# Patient Record
(demographics unavailable — no encounter records)

---

## 2025-07-24 NOTE — HISTORY OF PRESENT ILLNESS
[Improved Health] : Improved health [Quality of Life] : Quality of life [Home] : at home, [unfilled] , at the time of the visit. [Medical Office: ([facility])___] : at the medical office located in  [Telehealth (audio & video)] : This visit was provided via telehealth using real-time 2-way audio visual technology. [Verbal consent obtained from patient] : the patient, [unfilled] [FreeTextEntry1] : Eneida is a 27-year-old female with a longstanding history of obesity presents for initial consultation for weight-loss management. Pt reports weight gain over her adult life. Pt has tried various diets and exercise programs with limited long-term success.   Pt is interested in weight loss medications.   Weight Loss Medication Screening: Patient denies history of anxiety, substance abuse, uncontrolled blood pressure or any other cardiovascular conditions including but not limited to dysrhythmia, MI, CAD, heart failure, CVA. Patient denies history of pancreatitis or gallbladder conditions including but not limited to cholelithiasis, cholecystitis. Patient denies family or personal history thyroid cancer or MEN 2 syndrome. History of bariatric surgery: none Obesity comorbidities: [e.g. HTN, Type 2 DM] Comorbidities improved/resolved: n/a Anti-obesity medication tried: none Obesity medication side effects: n/a.   Starting weight at initial consultation: